# Patient Record
Sex: FEMALE | Race: BLACK OR AFRICAN AMERICAN | URBAN - METROPOLITAN AREA
[De-identification: names, ages, dates, MRNs, and addresses within clinical notes are randomized per-mention and may not be internally consistent; named-entity substitution may affect disease eponyms.]

---

## 2023-06-14 ENCOUNTER — LAB OUTSIDE AN ENCOUNTER (OUTPATIENT)
Dept: URBAN - METROPOLITAN AREA CLINIC 107 | Facility: CLINIC | Age: 63
End: 2023-06-14

## 2023-06-14 ENCOUNTER — DASHBOARD ENCOUNTERS (OUTPATIENT)
Age: 63
End: 2023-06-14

## 2023-06-14 ENCOUNTER — OFFICE VISIT (OUTPATIENT)
Dept: URBAN - METROPOLITAN AREA CLINIC 107 | Facility: CLINIC | Age: 63
End: 2023-06-14
Payer: MEDICARE

## 2023-06-14 ENCOUNTER — WEB ENCOUNTER (OUTPATIENT)
Dept: URBAN - METROPOLITAN AREA CLINIC 107 | Facility: CLINIC | Age: 63
End: 2023-06-14

## 2023-06-14 VITALS
RESPIRATION RATE: 16 BRPM | BODY MASS INDEX: 30.58 KG/M2 | TEMPERATURE: 97.3 F | HEIGHT: 61 IN | WEIGHT: 162 LBS | HEART RATE: 71 BPM | SYSTOLIC BLOOD PRESSURE: 143 MMHG | DIASTOLIC BLOOD PRESSURE: 81 MMHG

## 2023-06-14 DIAGNOSIS — Z12.11 SCREENING FOR COLON CANCER: ICD-10-CM

## 2023-06-14 DIAGNOSIS — K80.20 CALCULUS OF GALLBLADDER WITHOUT CHOLECYSTITIS WITHOUT OBSTRUCTION: ICD-10-CM

## 2023-06-14 DIAGNOSIS — K21.9 GERD: ICD-10-CM

## 2023-06-14 PROCEDURE — 99203 OFFICE O/P NEW LOW 30 MIN: CPT | Performed by: NURSE PRACTITIONER

## 2023-06-14 NOTE — HPI-TODAY'S VISIT:
64yo female referred by Dr. Moraima Rosado for evaluation of GERD and colon cancer screening. A copy of this document is being forwarded to the referring provider.  She is doing fairly well from a GI standpoint. No abdominal pain, nausea or vomiting. She did experience a recent excerbation of significant heartburn/reflux symptoms which lasted about 3 weeks. This ultimately resolved with use of antacids. She was seen in the ER at that time and states imaging showed gallstones (Mora's). Her bowel habits are regular without red blood per rectum or melena. She states recent labs with her PCP showed anemia. Her last colonoscopy was in [place] in 2012 and was normal.  Labs obtained during the visit from 4/11/23 show H/H 12/36.9, MCV 85, Plt 291, WBC 9.7. CMP unremarkable with normal LFTs.

## 2023-06-14 NOTE — HPI-OTHER HISTORIES
Labs 3/10/22: H/H 12.7/39.3, MCV 85.3, Plt 263, WBC 7.8. CMP unremarkable with normal LFTs. TSH 1.72.

## 2023-08-02 ENCOUNTER — TELEPHONE ENCOUNTER (OUTPATIENT)
Dept: URBAN - METROPOLITAN AREA CLINIC 6 | Facility: CLINIC | Age: 63
End: 2023-08-02

## 2023-08-10 ENCOUNTER — OFFICE VISIT (OUTPATIENT)
Dept: URBAN - METROPOLITAN AREA SURGERY CENTER 25 | Facility: SURGERY CENTER | Age: 63
End: 2023-08-10